# Patient Record
Sex: FEMALE | Race: WHITE | NOT HISPANIC OR LATINO | Employment: FULL TIME | URBAN - METROPOLITAN AREA
[De-identification: names, ages, dates, MRNs, and addresses within clinical notes are randomized per-mention and may not be internally consistent; named-entity substitution may affect disease eponyms.]

---

## 2019-01-28 ENCOUNTER — GYNECOLOGY VISIT (OUTPATIENT)
Dept: OBGYN | Facility: CLINIC | Age: 24
End: 2019-01-28
Payer: COMMERCIAL

## 2019-01-28 VITALS — DIASTOLIC BLOOD PRESSURE: 60 MMHG | WEIGHT: 164 LBS | SYSTOLIC BLOOD PRESSURE: 100 MMHG

## 2019-01-28 DIAGNOSIS — N91.2 AMENORRHEA: ICD-10-CM

## 2019-01-28 LAB
INT CON NEG: NEGATIVE
INT CON POS: POSITIVE
POC URINE PREGNANCY TEST: NEGATIVE

## 2019-01-28 PROCEDURE — 99203 OFFICE O/P NEW LOW 30 MIN: CPT | Mod: 25 | Performed by: OBSTETRICS & GYNECOLOGY

## 2019-01-28 PROCEDURE — 81025 URINE PREGNANCY TEST: CPT | Performed by: OBSTETRICS & GYNECOLOGY

## 2019-01-28 PROCEDURE — 76830 TRANSVAGINAL US NON-OB: CPT | Performed by: OBSTETRICS & GYNECOLOGY

## 2019-02-26 ENCOUNTER — GYNECOLOGY VISIT (OUTPATIENT)
Dept: OBGYN | Facility: MEDICAL CENTER | Age: 24
End: 2019-02-26
Payer: COMMERCIAL

## 2019-02-26 ENCOUNTER — HOSPITAL ENCOUNTER (OUTPATIENT)
Dept: LAB | Facility: MEDICAL CENTER | Age: 24
End: 2019-02-26
Attending: OBSTETRICS & GYNECOLOGY
Payer: COMMERCIAL

## 2019-02-26 VITALS
SYSTOLIC BLOOD PRESSURE: 118 MMHG | WEIGHT: 169 LBS | BODY MASS INDEX: 29.95 KG/M2 | DIASTOLIC BLOOD PRESSURE: 72 MMHG | HEIGHT: 63 IN

## 2019-02-26 DIAGNOSIS — E28.2 PCOS (POLYCYSTIC OVARIAN SYNDROME): ICD-10-CM

## 2019-02-26 DIAGNOSIS — N92.6 MISSED PERIOD: ICD-10-CM

## 2019-02-26 LAB
GLUCOSE 1.5H P CHAL SERPL-MCNC: 164 MG/DL (ref 65–139)
GLUCOSE 1H P CHAL SERPL-MCNC: 193 MG/DL (ref 65–199)
GLUCOSE 2H P CHAL SERPL-MCNC: 166 MG/DL (ref 65–139)
GLUCOSE 30M P CHAL SERPL-MCNC: 164 MG/DL (ref 65–199)
GLUCOSE BS SERPL-MCNC: 88 MG/DL (ref 65–99)
INT CON NEG: NEGATIVE
INT CON POS: POSITIVE
POC URINE PREGNANCY TEST: NEGATIVE

## 2019-02-26 PROCEDURE — 99214 OFFICE O/P EST MOD 30 MIN: CPT | Performed by: OBSTETRICS & GYNECOLOGY

## 2019-02-26 PROCEDURE — 84403 ASSAY OF TOTAL TESTOSTERONE: CPT

## 2019-02-26 PROCEDURE — 82951 GLUCOSE TOLERANCE TEST (GTT): CPT

## 2019-02-26 PROCEDURE — 36415 COLL VENOUS BLD VENIPUNCTURE: CPT

## 2019-02-26 PROCEDURE — 82952 GTT-ADDED SAMPLES: CPT

## 2019-02-26 PROCEDURE — 81025 URINE PREGNANCY TEST: CPT | Performed by: OBSTETRICS & GYNECOLOGY

## 2019-02-26 PROCEDURE — 84270 ASSAY OF SEX HORMONE GLOBUL: CPT

## 2019-02-26 NOTE — PROGRESS NOTES
Chief Complaint   Patient presents with   • Gynecologic Exam     Missed lorna   Chief complaint: Missed cycle      History of present illness: 23 y.o. presents to office for evaluation of missed cycle.  Patient reports her last menstrual period was on November 23, 2018.  No cycles since.  Patient has taken a pregnancy test at home, this has returned negative.    Patient seen by Dr. Martinez on January 28, 2019.  Negative pregnancy test at that time as well.    Patient presents today once again concerned that she might be pregnant, as she is experiencing signs and symptoms of pregnancy especially abdominal bloating.  No vaginal bleeding.  No current contraception.  Patient was on birth control pills but she stopped taking them in December 2018 as she thought she was pregnant at that time.      Review of systems:  Pertinent positives documented in HPI and a comprehensive review of system is negative    All PMH, PSH, allergies, social history and FH reviewed and updated today:  Past Medical History:   Diagnosis Date   • Asthma        Past Surgical History:   Procedure Laterality Date   • LUMPECTOMY     • TONSILLECTOMY         Allergies:   Allergies   Allergen Reactions   • Tape Rash     Likely from adhesive on SteriStrip tape   • Latex Rash       Social History     Social History   • Marital status: Single     Spouse name: N/A   • Number of children: N/A   • Years of education: N/A     Occupational History   • Not on file.     Social History Main Topics   • Smoking status: Never Smoker   • Smokeless tobacco: Never Used   • Alcohol use Not on file   • Drug use: Unknown   • Sexual activity: Yes     Partners: Male     Other Topics Concern   • Not on file     Social History Narrative   • No narrative on file       Family History   Problem Relation Age of Onset   • No Known Problems Mother    • Diabetes Father    • Hypertension Father    • No Known Problems Brother        Physical exam:  Blood pressure 118/72, height 1.6 m  "(5' 3\"), weight 76.7 kg (169 lb), last menstrual period 11/23/2018, not currently breastfeeding.    GENERAL APPEARANCE: healthy, alert, no distress  NECK nontender, no masses, thyromegaly or nodules  ABDOMEN Abdomen soft, non-tender. BS normal. No masses,  No organomegaly  FEMALE GYN: normal female external genitalia without lesions, BUS normal, no vaginal discharge noted, vulva pink without erythema or friability, urethra is normal without discharge or scarring, no bladder fullness or masses, normal external genitalia, no erythema, no discharge, normal cervix, normal uterus, size and consistency, normal adnexa without tenderness, normal anus and perineum.  NEURO Awake, alert and oriented x 3, Normal gait, no sensory deficits  SKIN No rashes, or ulcers or lesions seen  PSYCHIATRIC: Patient shows appropriate affect, is alert and oriented x3, intact judgment and insight.      Transvaginal ultrasound:    Read by me    Bedside ultrasound performed.  No evidence of intrauterine pregnancy seen.  Endometrial lining approximately 11 mm.  Bilateral ovaries evaluated numerous small cysts from 2-9 mm in size were noted bilaterally.  No free pelvic fluid.    Assessment:  1. Missed period  POCT Pregnancy   2. PCOS (polycystic ovarian syndrome)  GLUCOSE TOLERANCE 2 HOUR    LIPID PANEL    TESTOSTERONE F&T FEMALES/CHILD       Plan:  Discussed with patient's findings on examination as well as ultrasound.    She does fit the [place] criteria for PCOS at this time.  Has had mid cycles as well as polycystic ovaries on ultrasound.  No evidence of excess testosterone on exam today.    Discussed with patient association between polycystic ovarian syndrome and elevated cholesterol levels and glucose intolerance/diabetes.  Will perform laboratory testing    Negative urine hCG today in clinic.    Missed cycles likely secondary to anovulation and polycystic ovarian syndrome.    Discussed with patient options for management of polycystic " ovarian syndrome.  Also briefly discussed pregnancy in this setting, along with issues with infertility    Patient follow-up after laboratory results are back    Also discussed with the patient that a significant number women may begin to cycle spontaneously after losing 10-15% of the body weight.    Discussed with patient that prevention of endometrial hyperplasia is the mainstay of cystic ovarian syndrome management.  This is done with hormonal therapy, likely birth control pills.  Discussed with the patient that this is not compatible with trying to conceive.  Patient will decide whether she wants to try to conceive at this time or manage her polycystic ovarian syndrome.    Diet and lifestyle changes discussed    Questions answered    Spent 30 minutes in face-to-face patient contact in which greater than 50% of that visit was spent in counseling/coordination of care including medical and surgical options of care.

## 2019-02-26 NOTE — NON-PROVIDER
Pt here to discuss missed lorna  Pt states that she has not had a period since November 2018  Pharmacy verified.

## 2019-02-27 ENCOUNTER — TELEPHONE (OUTPATIENT)
Dept: OBGYN | Facility: CLINIC | Age: 24
End: 2019-02-27

## 2019-03-01 LAB
SHBG SERPL-SCNC: 55 NMOL/L (ref 30–135)
TESTOST FREE SERPL-MCNC: 7.9 PG/ML (ref 0.8–7.4)
TESTOST SERPL-MCNC: 64 NG/DL (ref 9–55)

## 2019-03-05 ENCOUNTER — TELEPHONE (OUTPATIENT)
Dept: OBGYN | Facility: CLINIC | Age: 24
End: 2019-03-05

## 2019-03-05 NOTE — TELEPHONE ENCOUNTER
----- Message from Eriberto Canales M.D. sent at 3/1/2019  4:54 PM PST -----  Regarding: RE: Rx  Contact: [phone]  Please schedule patient an appointment to discuss treatment for her polycystic ovarian syndrome      3/5/19 Called pt, unable to reach her. [facility]    3/5/19 1152 Pt called back, notified her that Dr. Canales would like to see patient to discuss lab results and PCOS, pt upset that he diagnosed patient and states she does not feel comfortable being a patient of his anymore because she took it as he doesn't want her to get pregnant. Offered appt for 3/14/19 at 230pm, pt states she already has an appt that day. Explained to patient that it would be best to see Dr. Canales as that is who she saw and knows her history. Pt states she will call another doctor and cancel her appt for 3/14/19 later when she has another appt scheduled with a different doctor.Pt had no other questions           ----- Message -----  From: Itzel Liu, Med Ass't  Sent: 2/27/2019   4:38 PM  To: Eriberto Canales M.D.  Subject: Rx                                               Hi Dr. Canales,    Pt called and stated after her visit with you yesterday and discussing all options, pt and chester have decided to try for pregnancy and pt requesting rx to be sent to pharmacy. Pt also asked if you can give her a call.    Itzel

## 2019-03-14 ENCOUNTER — GYNECOLOGY VISIT (OUTPATIENT)
Dept: OBGYN | Facility: MEDICAL CENTER | Age: 24
End: 2019-03-14
Payer: COMMERCIAL

## 2019-03-14 VITALS — BODY MASS INDEX: 30.29 KG/M2 | DIASTOLIC BLOOD PRESSURE: 60 MMHG | SYSTOLIC BLOOD PRESSURE: 120 MMHG | WEIGHT: 171 LBS

## 2019-03-14 DIAGNOSIS — E28.2 POLYCYSTIC OVARIAN SYNDROME: ICD-10-CM

## 2019-03-14 PROCEDURE — 99213 OFFICE O/P EST LOW 20 MIN: CPT | Performed by: OBSTETRICS & GYNECOLOGY

## 2019-03-14 RX ORDER — SPIRONOLACTONE 25 MG/1
25 TABLET ORAL DAILY
Qty: 30 TAB | Refills: 6 | Status: SHIPPED | OUTPATIENT
Start: 2019-03-14

## 2019-03-14 RX ORDER — LEVONORGESTREL AND ETHINYL ESTRADIOL 0.1-0.02MG
1 KIT ORAL DAILY
Qty: 28 TAB | Refills: 12 | Status: SHIPPED | OUTPATIENT
Start: 2019-03-14 | End: 2019-05-31

## 2019-03-14 NOTE — PROGRESS NOTES
Chief complaint: Discuss lab results      History of present illness: 23 y.o. presents to office for discussion of lab results and plan of care.  Patient history of irregular cycles, polycystic ovaries on ultrasound.  She underwent laboratory testing as well which showed elevated testosterone levels.  Testing consistent with polycystic ovarian syndrome.    Patient here to discuss plan of care.  She is considering pregnancy in the near future.    Review of systems:  Pertinent positives documented in HPI and a comprehensive review of system is negative    All PMH, PSH, allergies, social history and FH reviewed and updated today:  Past Medical History:   Diagnosis Date   • Asthma        Past Surgical History:   Procedure Laterality Date   • LUMPECTOMY     • TONSILLECTOMY         Allergies:   Allergies   Allergen Reactions   • Tape Rash     Likely from adhesive on SteriStrip tape   • Latex Rash       Social History     Social History   • Marital status: Single     Spouse name: N/A   • Number of children: N/A   • Years of education: N/A     Occupational History   • Not on file.     Social History Main Topics   • Smoking status: Never Smoker   • Smokeless tobacco: Never Used   • Alcohol use Not on file   • Drug use: Unknown   • Sexual activity: Yes     Partners: Male     Other Topics Concern   • Not on file     Social History Narrative   • No narrative on file       Family History   Problem Relation Age of Onset   • No Known Problems Mother    • Diabetes Father    • Hypertension Father    • No Known Problems Brother        Physical exam:  Blood pressure 120/60, weight 77.6 kg (171 lb), last menstrual period 11/01/2018, not currently breastfeeding.    GENERAL APPEARANCE: healthy, alert, no distress  NEURO Awake, alert and oriented x 3, Normal gait, no sensory deficits  PSYCHIATRIC: Patient shows appropriate affect, is alert and oriented x3, intact judgment and insight.      Assessment:  1. Polycystic ovarian syndrome  TSH     FREE THYROXINE    LIPID PANEL       Plan:  Discussed medical  options of care  Diet and lifestyle changes discussed.    Discussed with patient findings on ultrasound and laboratory data once again.  Her exam and findings are consistent with polycystic ovarian syndrome.  She has not obtain lipid testing yet.    Elevated glucose testing.    Discussed with the patient options for management:    1.  If patient desires to conceive at this time, ovulation induction with Clomid may be used.  Given her glucose metabolism abnormalities I would also start her on metformin.  2.  If the patient is willing to postpone pregnancy at this time, I would like to reduce her testosterone levels by starting her on Spironolactone and Metformin for her glucose metabolism abnormalities.  I would reassess in 3 months, probably continue treatment for total 6 months.  At that time I would discontinue her Spironolactone and see if there is any effect on her testosterone levels.    Discussed with the patient side effects of spironolactone and metformin.   Spironolactone. Drowsiness, headache, fever, hepatotoxicity, trauma cytopenia, amenorrhea, irregular menses and issues with fetal development in the presence of a male fetus  Metformin. Diarrhea, nausea vomiting, flatulence, myalgia, constipation, hypoglycemia    Plan at this time will be for treatment with spironolactone and metformin.  Patient is also on birth control pills.  Follow-up in 3 months.    RX sent electronically after discussion of side effects of medication with risks and benefits.   Questions answered    Spent 20 minutes in face-to-face patient contact in which greater than 50% of that visit was spent in counseling/coordination of care including medical and surgical options of care.

## 2019-04-27 ENCOUNTER — APPOINTMENT (OUTPATIENT)
Dept: URGENT CARE | Facility: PHYSICIAN GROUP | Age: 24
End: 2019-04-27
Payer: COMMERCIAL

## 2019-04-27 ENCOUNTER — OFFICE VISIT (OUTPATIENT)
Dept: URGENT CARE | Facility: CLINIC | Age: 24
End: 2019-04-27
Payer: COMMERCIAL

## 2019-04-27 VITALS
WEIGHT: 174 LBS | BODY MASS INDEX: 30.83 KG/M2 | HEART RATE: 70 BPM | HEIGHT: 63 IN | RESPIRATION RATE: 16 BRPM | OXYGEN SATURATION: 98 % | DIASTOLIC BLOOD PRESSURE: 70 MMHG | SYSTOLIC BLOOD PRESSURE: 122 MMHG | TEMPERATURE: 99 F

## 2019-04-27 DIAGNOSIS — H10.9 BACTERIAL CONJUNCTIVITIS OF RIGHT EYE: ICD-10-CM

## 2019-04-27 DIAGNOSIS — J01.40 ACUTE NON-RECURRENT PANSINUSITIS: ICD-10-CM

## 2019-04-27 PROCEDURE — 99214 OFFICE O/P EST MOD 30 MIN: CPT | Performed by: NURSE PRACTITIONER

## 2019-04-27 RX ORDER — POLYMYXIN B SULFATE AND TRIMETHOPRIM 1; 10000 MG/ML; [USP'U]/ML
1 SOLUTION OPHTHALMIC 4 TIMES DAILY
Qty: 1 BOTTLE | Refills: 0 | Status: SHIPPED | OUTPATIENT
Start: 2019-04-27 | End: 2019-05-02

## 2019-04-27 RX ORDER — AMOXICILLIN AND CLAVULANATE POTASSIUM 875; 125 MG/1; MG/1
1 TABLET, FILM COATED ORAL 2 TIMES DAILY
Qty: 14 TAB | Refills: 0 | Status: SHIPPED | OUTPATIENT
Start: 2019-04-27 | End: 2019-05-04

## 2019-04-27 ASSESSMENT — ENCOUNTER SYMPTOMS
NAUSEA: 0
DIARRHEA: 0
DIZZINESS: 0
MUSCULOSKELETAL NEGATIVE: 1
SINUS PAIN: 1
FEVER: 0
FOREIGN BODY SENSATION: 0
EYE ITCHING: 1
BLURRED VISION: 0
CONSTIPATION: 0
EYE REDNESS: 1
COUGH: 1
PHOTOPHOBIA: 0
PALPITATIONS: 0
SINUS PRESSURE: 1
DOUBLE VISION: 0
STRIDOR: 0
HEADACHES: 0
EYE DISCHARGE: 1
WHEEZING: 0
SORE THROAT: 0
CHILLS: 0
EYE PAIN: 0
VOMITING: 0
ABDOMINAL PAIN: 0
SHORTNESS OF BREATH: 0

## 2019-04-27 NOTE — PROGRESS NOTES
Subjective:   Nuris Fisher is a 23 y.o. female who presents for Sinus Problem (sinus infection) and Eye Problem (right eye, started yesterday afternoon)        Sinus Problem   This is a new problem. The current episode started 1 to 4 weeks ago (Started 2-3 weeks ago). The problem has been gradually worsening since onset. There has been no fever. The pain is severe. Associated symptoms include congestion, coughing and sinus pressure. Pertinent negatives include no chills, ear pain, headaches, shortness of breath or sore throat. Past treatments include oral decongestants. The treatment provided no relief.   Eye Problem    The right eye is affected. This is a new problem. The current episode started yesterday (States with redness and minimal discharge). The problem occurs constantly. There was no injury mechanism. The patient is experiencing no pain. There is no known exposure to pink eye. She wears contacts. Associated symptoms include an eye discharge, eye redness, itching and a recent URI. Pertinent negatives include no blurred vision, double vision, fever, foreign body sensation, nausea, photophobia or vomiting. Treatments tried: Warm compress. The treatment provided mild relief.        Review of Systems   Constitutional: Negative for chills and fever.   HENT: Positive for congestion, sinus pain and sinus pressure. Negative for ear discharge, ear pain and sore throat.    Eyes: Positive for discharge, redness and itching. Negative for blurred vision, double vision, photophobia and pain.   Respiratory: Positive for cough. Negative for shortness of breath, wheezing and stridor.    Cardiovascular: Negative for chest pain and palpitations.   Gastrointestinal: Negative for abdominal pain, constipation, diarrhea, nausea and vomiting.   Musculoskeletal: Negative.    Skin: Negative.  Negative for itching and rash.   Neurological: Negative for dizziness and headaches.   All other systems reviewed and are negative.    PMH:  " has a past medical history of Asthma.  MEDS:   Current Outpatient Prescriptions:   •  polymixin-trimethoprim (POLYTRIM) 66360-2.1 UNIT/ML-% Solution, Place 1 Drop in right eye 4 times a day for 5 days., Disp: 1 Bottle, Rfl: 0  •  amoxicillin-clavulanate (AUGMENTIN) 875-125 MG Tab, Take 1 Tab by mouth 2 times a day for 7 days., Disp: 14 Tab, Rfl: 0  •  levonorgestrel-ethinyl estradiol (AVIANE, ALESSE, LESSINA) 0.1-20 MG-MCG per tablet, Take 1 Tab by mouth every day., Disp: 28 Tab, Rfl: 12  •  metFORMIN (GLUCOPHAGE) 500 MG Tab, Take 1 Tab by mouth 2 times a day, with meals., Disp: 60 Tab, Rfl: 12  •  spironolactone (ALDACTONE) 25 MG Tab, Take 1 Tab by mouth every day., Disp: 30 Tab, Rfl: 6  •  Prenatal MV-Min-Fe Fum-FA-DHA (PRENATAL 1 PO), Take  by mouth., Disp: , Rfl:   ALLERGIES:   Allergies   Allergen Reactions   • Tape Rash     Likely from adhesive on SteriStrip tape   • Latex Rash     SURGHX:   Past Surgical History:   Procedure Laterality Date   • LUMPECTOMY     • TONSILLECTOMY       SOCHX:  reports that she has never smoked. She has never used smokeless tobacco.  FH: Family history was reviewed, no pertinent findings to report     Objective:   /70 (BP Location: Left arm, Patient Position: Sitting, BP Cuff Size: Adult)   Pulse 70   Temp 37.2 °C (99 °F) (Temporal)   Resp 16   Ht 1.6 m (5' 3\")   Wt 78.9 kg (174 lb)   SpO2 98%   BMI 30.82 kg/m²   Physical Exam   Constitutional: She is oriented to person, place, and time. She appears well-developed and well-nourished. No distress.   HENT:   Head: Normocephalic.   Right Ear: Ear canal normal. Tympanic membrane is not erythematous. Tympanic membrane mobility is abnormal. A middle ear effusion is present. Decreased hearing is noted.   Left Ear: Ear canal normal. Tympanic membrane is bulging. Tympanic membrane is not erythematous. A middle ear effusion is present. Decreased hearing is noted.   Nose: Mucosal edema present. No rhinorrhea. Right sinus " exhibits maxillary sinus tenderness and frontal sinus tenderness. Left sinus exhibits maxillary sinus tenderness and frontal sinus tenderness.   Mouth/Throat: Oropharynx is clear and moist and mucous membranes are normal. No posterior oropharyngeal erythema. Tonsils are 0 on the right. Tonsils are 0 on the left. No tonsillar exudate.   Post nasal drip   Eyes: Pupils are equal, round, and reactive to light. EOM and lids are normal.   Right eye with conjunctival redness and minimal discharge. Swelling to right lower eyelid with redness   Neck: Normal range of motion. No thyromegaly present.   Cardiovascular: Normal rate, regular rhythm and normal heart sounds.    Pulmonary/Chest: Effort normal and breath sounds normal. No accessory muscle usage. No apnea, no tachypnea and no bradypnea. No respiratory distress. She has no decreased breath sounds. She has no wheezes.   Lymphadenopathy:        Head (right side): No submandibular and no tonsillar adenopathy present.        Head (left side): Submandibular adenopathy present. No tonsillar adenopathy present.   Neurological: She is alert and oriented to person, place, and time.   Skin: Skin is warm. No rash noted. She is not diaphoretic.   Psychiatric: She has a normal mood and affect. Her speech is normal and behavior is normal. Judgment and thought content normal.   Vitals reviewed.        Assessment/Plan:   Assessment    1. Acute non-recurrent pansinusitis  - amoxicillin-clavulanate (AUGMENTIN) 875-125 MG Tab; Take 1 Tab by mouth 2 times a day for 7 days.  Dispense: 14 Tab; Refill: 0    2. Bacterial conjunctivitis of right eye  - polymixin-trimethoprim (POLYTRIM) 42821-2.1 UNIT/ML-% Solution; Place 1 Drop in right eye 4 times a day for 5 days.  Dispense: 1 Bottle; Refill: 0    Use over-the-counter Flonase daily, one spray each nostril in the morning.  You may use over-the-counter Zyrtec or Claritin daily for relief of symptoms; follow the package instructions for  dosing.    Differential diagnosis, natural history, supportive care, and indications for immediate follow-up discussed.

## 2019-04-27 NOTE — LETTER
April 27, 2019         Patient: Nuris Fisher   YOB: 1995   Date of Visit: 4/27/2019           To Whom it May Concern:    Nuris Fisher was seen in my clinic on 4/27/2019. Please excuse her from work 4/29/2019. She may return on 4/30/2019.    If you have any questions or concerns, please don't hesitate to call.        Sincerely,           JARED Goldstein.  Electronically Signed

## 2019-04-29 ENCOUNTER — TELEPHONE (OUTPATIENT)
Dept: URGENT CARE | Facility: CLINIC | Age: 24
End: 2019-04-29

## 2019-04-29 NOTE — TELEPHONE ENCOUNTER
Daja,    This patient called and said that her right eye is still watery and irritated. It was started under her eye and now it is in her eye. She was wondering if she's not contagious if she's going back to work tomorrow. If she's is, she wanted to go back to work on May 1st instead tomorrow. Let me know so I can call her back.    Ofelia

## 2019-04-29 NOTE — LETTER
April 29, 2019         Patient: Nuris Fisher   YOB: 1995   Date of Visit: 4/29/2019           To Whom it May Concern:    Nuris Fisher was seen in my clinic on 4/27/2019. Please excuse her from work 4/29/2019 through 4/30/2019. She may return on 5/1/2019.    If you have any questions or concerns, please don't hesitate to call.        Sincerely,           JARED Goldstein.  Electronically Signed

## 2019-05-14 ENCOUNTER — OFFICE VISIT (OUTPATIENT)
Dept: URGENT CARE | Facility: CLINIC | Age: 24
End: 2019-05-14
Payer: COMMERCIAL

## 2019-05-14 VITALS
WEIGHT: 175 LBS | HEIGHT: 63 IN | HEART RATE: 71 BPM | OXYGEN SATURATION: 96 % | DIASTOLIC BLOOD PRESSURE: 72 MMHG | RESPIRATION RATE: 16 BRPM | BODY MASS INDEX: 31.01 KG/M2 | TEMPERATURE: 99.1 F | SYSTOLIC BLOOD PRESSURE: 120 MMHG

## 2019-05-14 DIAGNOSIS — Z87.42 HISTORY OF PCOS: ICD-10-CM

## 2019-05-14 DIAGNOSIS — R11.0 NAUSEA: ICD-10-CM

## 2019-05-14 DIAGNOSIS — R10.31 BILATERAL LOWER ABDOMINAL CRAMPING: ICD-10-CM

## 2019-05-14 DIAGNOSIS — R10.32 BILATERAL LOWER ABDOMINAL CRAMPING: ICD-10-CM

## 2019-05-14 LAB
APPEARANCE UR: NORMAL
BILIRUB UR STRIP-MCNC: NORMAL MG/DL
COLOR UR AUTO: YELLOW
GLUCOSE UR STRIP.AUTO-MCNC: NORMAL MG/DL
INT CON NEG: NORMAL
INT CON POS: NORMAL
KETONES UR STRIP.AUTO-MCNC: NORMAL MG/DL
LEUKOCYTE ESTERASE UR QL STRIP.AUTO: NORMAL
NITRITE UR QL STRIP.AUTO: NORMAL
PH UR STRIP.AUTO: 7 [PH] (ref 5–8)
POC URINE PREGNANCY TEST: NEGATIVE
PROT UR QL STRIP: NORMAL MG/DL
RBC UR QL AUTO: NORMAL
SP GR UR STRIP.AUTO: 1.02
UROBILINOGEN UR STRIP-MCNC: 0.2 MG/DL

## 2019-05-14 PROCEDURE — 81002 URINALYSIS NONAUTO W/O SCOPE: CPT | Performed by: PHYSICIAN ASSISTANT

## 2019-05-14 PROCEDURE — 81025 URINE PREGNANCY TEST: CPT | Performed by: PHYSICIAN ASSISTANT

## 2019-05-14 PROCEDURE — 99213 OFFICE O/P EST LOW 20 MIN: CPT | Performed by: PHYSICIAN ASSISTANT

## 2019-05-14 ASSESSMENT — ENCOUNTER SYMPTOMS
BACK PAIN: 0
ABDOMINAL PAIN: 0
CONSTIPATION: 0
DIARRHEA: 0
WHEEZING: 0
SPUTUM PRODUCTION: 0
VOMITING: 0
COUGH: 0
NAUSEA: 0
BLOOD IN STOOL: 0
CHILLS: 0
FEVER: 0
FLANK PAIN: 0
PAIN: 1
SHORTNESS OF BREATH: 0

## 2019-05-15 NOTE — PROGRESS NOTES
"Subjective:   Nuris Fisher is a 23 y.o. female who presents for Pain (lower abdominal pain, cramping started a week ago, lot of discharges, hot flashes and mood swing)        Pain   This is a new problem. The current episode started in the past 7 days. Pertinent negatives include no abdominal pain ( denies pain, c/o cramps, none today), chills, coughing, fever, nausea ( denies currently but more lately), rash or vomiting.     Patient with recent history of diagnosis of polycystic ovarian syndrome comes to clinic complaining of menstrual cramping causing discomfort in the lower abdomen notes mild vaginal discharge -- notes last 1.5qwkss of lower abd cramping, achiness to low back and hips, body aches, notes some cervical discomfort \" it's just kind of sore\", denies dysuria/freq/urg/hematuria, notes PMH of UTI, PMH of IBS but denies recent diarrhea or constipation. C/o hot flashes. Tried using one single dose of ibu w/ no change in s/sx. Has been trying to get pregnant but gyncologist recommends against it due to social situation. Notes few moments of nausea, denies emesis. C/o lower abd cramps, denies PMH of abd sugery.     Review of Systems   Constitutional: Negative for chills and fever.   Respiratory: Negative for cough, sputum production, shortness of breath and wheezing.    Gastrointestinal: Negative for abdominal pain ( denies pain, c/o cramps, none today), blood in stool, constipation, diarrhea, melena, nausea ( denies currently but more lately) and vomiting.   Genitourinary: Negative for dysuria, flank pain, frequency, hematuria and urgency.   Musculoskeletal: Negative for back pain.   Skin: Negative for rash.     Allergies   Allergen Reactions   • Tape Rash     Likely from adhesive on SteriStrip tape   • Latex Rash      Objective:   /72   Pulse 71   Temp 37.3 °C (99.1 °F)   Resp 16   Ht 1.6 m (5' 3\")   Wt 79.4 kg (175 lb)   SpO2 96%   BMI 31.00 kg/m²   Physical Exam   Constitutional: She is " oriented to person, place, and time. She appears well-developed and well-nourished. No distress.   HENT:   Head: Normocephalic and atraumatic.   Right Ear: External ear normal.   Left Ear: External ear normal.   Nose: Nose normal.   Eyes: Conjunctivae and lids are normal. Right eye exhibits no discharge. Left eye exhibits no discharge. No scleral icterus.   Neck: Neck supple.   Pulmonary/Chest: Effort normal. No respiratory distress.   Abdominal: Soft. Normal appearance and bowel sounds are normal. There is no tenderness. There is no rigidity, no rebound, no guarding, no CVA tenderness, no tenderness at McBurney's point and negative Milton's sign.   Musculoskeletal: Normal range of motion.   Neurological: She is alert and oriented to person, place, and time. She is not disoriented.   Skin: Skin is warm and dry. She is not diaphoretic. No erythema. No pallor.   Psychiatric: Her speech is normal and behavior is normal.   Nursing note and vitals reviewed.  POCT HCG-  NEG  POCT UA - NORMAL      Assessment/Plan:   1. Bilateral lower abdominal cramping  - POCT Urinalysis  - POCT Pregnancy  - REFERRAL TO OB/GYN    2. Nausea  - REFERRAL TO OB/GYN    3. History of PCOS  Supportive care is reviewed with patient/caregiver - recommend to push PO fluids and electrolytes, pt w/ appt w/ GYN in less than two weeks, is somewhat unhappy w/ GYN MD and requests new referral    Return to clinic with lack of resolution or progression of symptoms.  ER precautions with any worsening symptoms are reviewed with patient/caregiver and they do express understanding    Differential diagnosis, natural history, supportive care, and indications for immediate follow-up discussed.

## 2019-05-31 ENCOUNTER — GYNECOLOGY VISIT (OUTPATIENT)
Dept: OBGYN | Facility: MEDICAL CENTER | Age: 24
End: 2019-05-31
Payer: COMMERCIAL

## 2019-05-31 VITALS
SYSTOLIC BLOOD PRESSURE: 114 MMHG | DIASTOLIC BLOOD PRESSURE: 62 MMHG | BODY MASS INDEX: 24.11 KG/M2 | WEIGHT: 178 LBS | HEIGHT: 72 IN

## 2019-05-31 DIAGNOSIS — O36.80X0 PREGNANCY, LOCATION UNKNOWN: ICD-10-CM

## 2019-05-31 PROCEDURE — 76830 TRANSVAGINAL US NON-OB: CPT | Performed by: OBSTETRICS & GYNECOLOGY

## 2019-05-31 PROCEDURE — 99214 OFFICE O/P EST MOD 30 MIN: CPT | Mod: 25 | Performed by: OBSTETRICS & GYNECOLOGY

## 2019-05-31 NOTE — PROGRESS NOTES
"Chief complaint: Positive pregnancy test     Nuris Fisher,  23 y.o.  female with No LMP recorded. Patient is not currently having periods (Reason: Irregular Menses). presents today with complaint of positive pregnancy test.    Patient recently scheduled for follow-up with due to PCOS with elevated testosterone levels.  She reports she stopped taking spironolactone approximately 1 month after starting due to side effects.  Has not been on any contraception since then.    Reports last menstrual period around May 1.  She reports positive pregnancy test at home.    Subjective : Patient presents to the office for absent menses.    Nausea/Vomiting: No    Abdominal /pelvic cramping: No  Vaginal bleeding: No  Positive home UPT yes  Other symptoms: No    Pertinent positives documented in HPI and all other systems reviewed & are negative    OB History    Para Term  AB Living   1             SAB TAB Ectopic Molar Multiple Live Births                    # Outcome Date GA Lbr Alan/2nd Weight Sex Delivery Anes PTL Lv   1                    Past Gyn history:  hx STDs no    Past Medical History:   Diagnosis Date   • Asthma        Past Surgical History:   Procedure Laterality Date   • LUMPECTOMY     • TONSILLECTOMY         Meds: Prenatal vitamins    Allergies: Tape and Latex    Physical Exam:    /62   Ht 1.905 m (6' 3\")   Wt 80.7 kg (178 lb)   BMI 22.25 kg/m²   Gen: well-appearing, well-hydrated, well-nourished  HEENT: normal;   PERRLA, EOMI, sclera clear  Lungs: Clear to auscultation  Heart: RRR No M  Abd: abdomen is soft without significant tenderness, masses, organomegaly or guarding  Pelvic: External genitalia normal, Vagina normal without discharge, Urethra without abnormality or discharge, cervix normal in appearance  Ext: NT bilaterally, no cyanosis, clubbing or edema    No results found for this or any previous visit (from the past 336 hour(s)).    Ultrasound: Transvaginal US performed and " per my read:    Indication: Dating    Findings: No intrauterine pregnancy noted on examination.  No yolk sac, no gestational sac or fetal pole  Right ovary possible adnexal mass noted.  Approximately 2 to 3 cm, no gestational sac or fetal heart rate noted. Left Ovary no masses. Cervical length 3.7 cm.   No free fluid in the cul-de-sac.    Impression: Pregnancy unknown viability      Assessment:  23 y.o.   amenorrhea  Pregnancy exam/test positive    Plan:  Findings discussed with patient.  No clear evidence of intrauterine pregnancy at this time.    We will check quantitative hCG at this time.  Repeat in 48 hours.    Discussed with patient that there is no evidence of intrarenal pregnancy at this time and an ectopic pregnancy cannot be ruled out.  Strict precautions were discussed with patient.  Patient is aware she is to report to ER if worsening abdominal pain or vaginal bleeding.    Call MD granda/ questions or concerns    All questions answered

## 2019-06-07 ENCOUNTER — GYNECOLOGY VISIT (OUTPATIENT)
Dept: OBGYN | Facility: MEDICAL CENTER | Age: 24
End: 2019-06-07
Payer: COMMERCIAL

## 2019-06-07 VITALS — BODY MASS INDEX: 22.37 KG/M2 | WEIGHT: 179 LBS | SYSTOLIC BLOOD PRESSURE: 128 MMHG | DIASTOLIC BLOOD PRESSURE: 68 MMHG

## 2019-06-07 DIAGNOSIS — Z3A.01 LESS THAN 8 WEEKS GESTATION OF PREGNANCY: ICD-10-CM

## 2019-06-07 PROCEDURE — 76817 TRANSVAGINAL US OBSTETRIC: CPT | Performed by: OBSTETRICS & GYNECOLOGY

## 2019-06-07 PROCEDURE — 99213 OFFICE O/P EST LOW 20 MIN: CPT | Mod: 25 | Performed by: OBSTETRICS & GYNECOLOGY

## 2019-06-08 NOTE — PROGRESS NOTES
Chief complaint: Viability scan      History of present illness: 23 y.o. presents to office for viability scan.  Had hCG drawn last week, rising appropriately.    No vaginal bleeding    Review of systems:  Pertinent positives documented in HPI and a comprehensive review of system is negative    All PMH, PSH, allergies, social history and FH reviewed and updated today:  Past Medical History:   Diagnosis Date   • Asthma        Past Surgical History:   Procedure Laterality Date   • LUMPECTOMY     • TONSILLECTOMY         Allergies:   Allergies   Allergen Reactions   • Tape Rash     Likely from adhesive on SteriStrip tape   • Latex Rash       Social History     Social History   • Marital status: Single     Spouse name: N/A   • Number of children: N/A   • Years of education: N/A     Occupational History   • Not on file.     Social History Main Topics   • Smoking status: Never Smoker   • Smokeless tobacco: Never Used   • Alcohol use Not on file   • Drug use: Unknown   • Sexual activity: Yes     Partners: Male     Other Topics Concern   • Not on file     Social History Narrative   • No narrative on file       Family History   Problem Relation Age of Onset   • No Known Problems Mother    • Diabetes Father    • Hypertension Father    • No Known Problems Brother        Physical exam:  /68 (BP Location: Left arm)   Wt 81.2 kg (179 lb)     GENERAL APPEARANCE: healthy, alert  ABDOMEN Abdomen soft, non-tender. BS normal. No masses,  No organomegaly  FEMALE GYN: normal female external genitalia without lesions, BUS normal, no vaginal discharge noted, vulva pink without erythema or friability, urethra is normal without discharge or scarring, no bladder fullness or masses, normal external genitalia, no erythema, no discharge, normal anus and perineum.  NEURO Awake, alert and oriented x 3, Normal gait, no sensory deficits  PSYCHIATRIC: Patient shows appropriate affect, is alert and oriented x3, intact judgment and  insight.    Transvaginal US performed and per my read:    Indication: Viability    Findings: Positive gestational sac.  Positive yolk sac.   No fetal cardiac activity noted.  Right ovary no masses. Left Ovary no masses. Cervical length 3.6 cm.   No free fluid in the cul-de-sac.    Impression: Very early intrauterine pregnancy      Assessment:  1. Less than 8 weeks gestation of pregnancy         Plan:    Very early intrauterine pregnancy noted.  This was discussed with the patient.    Follow-up 4 weeks for new OB visit    Questions answered    Spent 15 minutes in face-to-face patient contact in which greater than 50% of that visit was spent in counseling/coordination of care including medical and surgical options of care.

## 2019-06-10 ENCOUNTER — OFFICE VISIT (OUTPATIENT)
Dept: URGENT CARE | Facility: CLINIC | Age: 24
End: 2019-06-10
Payer: COMMERCIAL

## 2019-06-10 VITALS
HEART RATE: 89 BPM | TEMPERATURE: 98.7 F | WEIGHT: 175 LBS | BODY MASS INDEX: 31.01 KG/M2 | SYSTOLIC BLOOD PRESSURE: 118 MMHG | HEIGHT: 63 IN | OXYGEN SATURATION: 94 % | RESPIRATION RATE: 16 BRPM | DIASTOLIC BLOOD PRESSURE: 82 MMHG

## 2019-06-10 DIAGNOSIS — L73.1 INGROWN HAIR: ICD-10-CM

## 2019-06-10 PROCEDURE — 99214 OFFICE O/P EST MOD 30 MIN: CPT | Performed by: PHYSICIAN ASSISTANT

## 2019-06-10 RX ORDER — CEPHALEXIN 500 MG/1
500 CAPSULE ORAL 3 TIMES DAILY
Qty: 21 CAP | Refills: 0 | Status: SHIPPED | OUTPATIENT
Start: 2019-06-10 | End: 2019-06-17

## 2019-06-10 ASSESSMENT — ENCOUNTER SYMPTOMS
RESPIRATORY NEGATIVE: 1
GASTROINTESTINAL NEGATIVE: 1
FEVER: 0
CHILLS: 0
CARDIOVASCULAR NEGATIVE: 1

## 2019-06-11 ASSESSMENT — ENCOUNTER SYMPTOMS: ROS SKIN COMMENTS: INGROWN HAIR

## 2019-06-11 NOTE — PROGRESS NOTES
"Subjective:      Nuris Fisher is a 23 y.o. female who presents with Other (Ingrown hair,groin area )            Ingrown hair left groin area causing a small cyst.  It did rupture draining a bloody/colored fluid.  Tenderness has decreased.  No urinary symptoms.  No constitutional symptoms.  Patient is 7 weeks pregnant      Rash   This is a new problem. The current episode started today. The problem is unchanged. The affected locations include the groin. The rash is characterized by swelling and redness. She was exposed to nothing. Pertinent negatives include no fever. Past treatments include nothing. The treatment provided no relief.     7 weeks pregnant    PMH:  has a past medical history of Asthma.  MEDS:   Current Outpatient Prescriptions:   •  spironolactone (ALDACTONE) 25 MG Tab, Take 1 Tab by mouth every day., Disp: 30 Tab, Rfl: 6  •  Prenatal MV-Min-Fe Fum-FA-DHA (PRENATAL 1 PO), Take  by mouth., Disp: , Rfl:   ALLERGIES:   Allergies   Allergen Reactions   • Tape Rash     Likely from adhesive on SteriStrip tape   • Latex Rash     SURGHX:   Past Surgical History:   Procedure Laterality Date   • LUMPECTOMY     • TONSILLECTOMY       SOCHX:  reports that she has never smoked. She has never used smokeless tobacco.  FH: family history includes Diabetes in her father; Hypertension in her father; No Known Problems in her brother and mother.      Review of Systems   Constitutional: Negative for chills and fever.   Respiratory: Negative.    Cardiovascular: Negative.    Gastrointestinal: Negative.    Genitourinary: Negative.    Skin: Positive for rash. Negative for itching.        Ingrown hair       Medications, Allergies, and current problem list reviewed today in Epic     Objective:     /82 (BP Location: Right arm, Patient Position: Sitting)   Pulse 89   Temp 37.1 °C (98.7 °F) (Temporal)   Resp 16   Ht 1.6 m (5' 3\")   Wt 79.4 kg (175 lb)   SpO2 94%   BMI 31.00 kg/m²      Physical Exam   Constitutional: " She is oriented to person, place, and time. She appears well-developed and well-nourished. No distress.   HENT:   Head: Normocephalic and atraumatic.   Eyes: Conjunctivae are normal.   Neck: Normal range of motion. Neck supple.   Cardiovascular: Normal rate, regular rhythm and normal heart sounds.    Pulmonary/Chest: Effort normal and breath sounds normal. No respiratory distress. She has no wheezes.   Genitourinary:       Pelvic exam was performed with patient supine. There is no rash on the right labia. There is tenderness and lesion on the left labia. There is no rash on the left labia.   Genitourinary Comments: Pelvic exam performed in the presence of a female chaperone.  Small cyst noted on the left external genitalia.  Nontender.  Open and draining.  No surrounding erythema.   Lymphadenopathy:        Right: No inguinal adenopathy present.        Left: No inguinal adenopathy present.   Neurological: She is alert and oriented to person, place, and time.   Skin: Skin is warm and dry. She is not diaphoretic.   Psychiatric: She has a normal mood and affect. Her behavior is normal. Judgment and thought content normal.   Nursing note and vitals reviewed.              Assessment/Plan:     1. Ingrown hair  cephALEXin (KEFLEX) 500 MG Cap     Draining cyst.  No signs of infection.  Patient is 7 weeks pregnant so we will hold antibiotic therapy.  She is leaving for [place] in 2 days and wishes to have a prescription for her.  She is given strict protocol and symptoms for which she should start the antibiotics.  Patient was given a contingent antibiotic prescription to fill and use as directed if symptoms progressed as discussed and agreed upon.    OTC meds and conservative measures as discussed    Return to clinic or go to ED if symptoms worsen or persist. Indications for ED discussed at length. Patient voices understanding. Follow-up with your primary care provider in 3-5 days. Red flags discussed. All side effects of  medication discussed including allergic response, GI upset, tendon injury, etc.    Please note that this dictation was created using voice recognition software. I have made every reasonable attempt to correct obvious errors, but I expect that there are errors of grammar and possibly content that I did not discover before finalizing the note.